# Patient Record
Sex: MALE | Race: WHITE | HISPANIC OR LATINO | URBAN - METROPOLITAN AREA
[De-identification: names, ages, dates, MRNs, and addresses within clinical notes are randomized per-mention and may not be internally consistent; named-entity substitution may affect disease eponyms.]

---

## 2021-04-02 ENCOUNTER — OFFICE (OUTPATIENT)
Dept: URBAN - METROPOLITAN AREA CLINIC 79 | Facility: CLINIC | Age: 46
End: 2021-04-02
Payer: MEDICAID

## 2021-04-02 VITALS
HEIGHT: 70 IN | HEART RATE: 77 BPM | TEMPERATURE: 97.6 F | DIASTOLIC BLOOD PRESSURE: 75 MMHG | WEIGHT: 235 LBS | SYSTOLIC BLOOD PRESSURE: 123 MMHG

## 2021-04-02 DIAGNOSIS — E66.9 OBESITY, UNSPECIFIED: ICD-10-CM

## 2021-04-02 DIAGNOSIS — R68.81 EARLY SATIETY: ICD-10-CM

## 2021-04-02 DIAGNOSIS — R11.0 NAUSEA: ICD-10-CM

## 2021-04-02 DIAGNOSIS — R10.12 LEFT UPPER QUADRANT PAIN: ICD-10-CM

## 2021-04-02 DIAGNOSIS — K21.9 GASTRO-ESOPHAGEAL REFLUX DISEASE WITHOUT ESOPHAGITIS: ICD-10-CM

## 2021-04-02 PROCEDURE — 99204 OFFICE O/P NEW MOD 45 MIN: CPT | Performed by: PHYSICIAN ASSISTANT

## 2021-04-02 NOTE — SERVICEHPINOTES
Mr. Renee is here to discuss GERD and stomach pain present for yrs but worse in the past several weeks. Symptoms are now daily. He has heartburn and reflux. He has LUQ pain not triggered by anything specific and not brought on by any certain types of food. + nausea but no vomiting and no dysphagia. Has lost about 3 lbs in the past few weeks. + early satiety. No black stool. Takes Omeprazole, has been on it for 3 weeks. So far, it has helped a little bit. Taking Famotidine 20 mg BID which helps a little bit. Takes Advil infrequently.

## 2021-04-06 ENCOUNTER — ON CAMPUS - OUTPATIENT (OUTPATIENT)
Dept: URBAN - METROPOLITAN AREA HOSPITAL 65 | Facility: HOSPITAL | Age: 46
End: 2021-04-06
Payer: MEDICAID

## 2021-04-06 DIAGNOSIS — R10.12 LEFT UPPER QUADRANT PAIN: ICD-10-CM

## 2021-04-06 DIAGNOSIS — K21.9 GASTRO-ESOPHAGEAL REFLUX DISEASE WITHOUT ESOPHAGITIS: ICD-10-CM

## 2021-04-06 DIAGNOSIS — R68.81 EARLY SATIETY: ICD-10-CM

## 2021-04-06 DIAGNOSIS — K29.60 OTHER GASTRITIS WITHOUT BLEEDING: ICD-10-CM

## 2021-04-06 PROCEDURE — 43239 EGD BIOPSY SINGLE/MULTIPLE: CPT | Performed by: INTERNAL MEDICINE

## 2023-04-04 ENCOUNTER — OFFICE (OUTPATIENT)
Dept: URBAN - METROPOLITAN AREA CLINIC 79 | Facility: CLINIC | Age: 48
End: 2023-04-04

## 2023-04-04 VITALS
HEIGHT: 70 IN | WEIGHT: 235 LBS | SYSTOLIC BLOOD PRESSURE: 141 MMHG | TEMPERATURE: 96.1 F | HEART RATE: 89 BPM | DIASTOLIC BLOOD PRESSURE: 87 MMHG

## 2023-04-04 DIAGNOSIS — K59.09 OTHER CONSTIPATION: ICD-10-CM

## 2023-04-04 DIAGNOSIS — R11.0 NAUSEA: ICD-10-CM

## 2023-04-04 DIAGNOSIS — R10.12 LEFT UPPER QUADRANT PAIN: ICD-10-CM

## 2023-04-04 DIAGNOSIS — K21.9 GASTRO-ESOPHAGEAL REFLUX DISEASE WITHOUT ESOPHAGITIS: ICD-10-CM

## 2023-04-04 PROCEDURE — 99214 OFFICE O/P EST MOD 30 MIN: CPT

## 2023-04-04 RX ORDER — PANTOPRAZOLE SODIUM 40 MG/1
TABLET, DELAYED RELEASE ORAL
Qty: 90 | Refills: 3 | Status: ACTIVE
Start: 2023-04-04

## 2023-04-04 RX ORDER — FAMOTIDINE 40 MG/1
TABLET, FILM COATED ORAL
Qty: 90 | Refills: 0 | Status: ACTIVE
Start: 2023-04-04

## 2023-04-04 NOTE — SERVICEHPINOTES
46 y/o male c/o "gastritis". 
br Patient describes episodes of "severe" LUQ pain, burning in quality, assoc w nausea and increased acid reflux. No correlation w po intake. He reports having similar sx in the past, remote hx or h. pylori and chronic gastritis on EGD 4/2021. He has taken omeprazole on and off since 2021, decided to d/c 3 days ago as he felt it was not helping. Endorses acid regurgitation, sore throat. There is no vomiting or dysphagia. He is not taking NSAIDs, though he has in the past. Recently started second med for BP and wonders if that is the cause for current sx. Has been watching diet since pain recurred. 
br No etoh or tobacco. +travel to [facility] 12/2022.br
br Bowel habits are generally regular 1 BM/day type 4 BSS but reports mild constipation that started last week. Seems to be improving with "green juice" in the mornings. No hematochezia or melena. No prior CSY.
br
br Denies fevers, chills, night sweats, unintentional weight loss.br